# Patient Record
Sex: MALE | Race: WHITE | Employment: FULL TIME | URBAN - METROPOLITAN AREA
[De-identification: names, ages, dates, MRNs, and addresses within clinical notes are randomized per-mention and may not be internally consistent; named-entity substitution may affect disease eponyms.]

---

## 2021-06-09 ENCOUNTER — OFFICE VISIT (OUTPATIENT)
Dept: ORTHOPEDIC SURGERY | Age: 36
End: 2021-06-09
Payer: COMMERCIAL

## 2021-06-09 VITALS — BODY MASS INDEX: 30.8 KG/M2 | HEIGHT: 71 IN | WEIGHT: 220 LBS

## 2021-06-09 DIAGNOSIS — M54.16 LUMBAR RADICULAR PAIN: Primary | ICD-10-CM

## 2021-06-09 DIAGNOSIS — M62.830 LUMBAR PARASPINAL MUSCLE SPASM: ICD-10-CM

## 2021-06-09 PROCEDURE — G8427 DOCREV CUR MEDS BY ELIG CLIN: HCPCS | Performed by: PHYSICIAN ASSISTANT

## 2021-06-09 PROCEDURE — 4004F PT TOBACCO SCREEN RCVD TLK: CPT | Performed by: PHYSICIAN ASSISTANT

## 2021-06-09 PROCEDURE — G8417 CALC BMI ABV UP PARAM F/U: HCPCS | Performed by: PHYSICIAN ASSISTANT

## 2021-06-09 PROCEDURE — 99203 OFFICE O/P NEW LOW 30 MIN: CPT | Performed by: PHYSICIAN ASSISTANT

## 2021-06-09 RX ORDER — CYCLOBENZAPRINE HCL 10 MG
10 TABLET ORAL 3 TIMES DAILY PRN
Qty: 20 TABLET | Refills: 0 | Status: SHIPPED | OUTPATIENT
Start: 2021-06-09 | End: 2021-06-19

## 2021-06-09 RX ORDER — METHYLPREDNISOLONE 4 MG/1
TABLET ORAL
Qty: 1 KIT | Refills: 0 | Status: SHIPPED | OUTPATIENT
Start: 2021-06-09 | End: 2021-09-07

## 2021-06-10 NOTE — PROGRESS NOTES
This dictation was done with [place] dictation and may contain mechanical errors related to translation. I have today reviewed with Wendy Stanton the clinically relevant, past medical history, medications, allergies, family history, social history, and Review Of Systems form the patients most recent history form & I have documented any details relevant to today's presenting complaints in my history below. Mr. Salvatore Ramírez's self-reported past medical history, medications, allergies, family history, social history, and Review Of Systems form has been scanned into the chart under the \"Media\" tab. Subjective:  Wendy Stanton is a 30 y. o. who is here complaining of an acute onset of lower back pain that is radiating down the left leg and with. 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 there was one particular time when he was moving a refrigerator and had to lean forward and felt a pull in the back around 3 weeks ago. He says at rest he is throughout 10 pain but his knees and has never. I sent him for x-rays including an AP and lateral lumbar spine. There is no problem list on file for this patient. No current outpatient medications on file prior to visit. No current facility-administered medications on file prior to visit. Objective:   Height 5' 11\" (1.803 m), weight 220 lb (99.8 kg). Upon examination this is a pleasant 28-year-old gentleman in no acute distress he is alert and oriented x3 he is somewhat apprehensive when he is getting up in moving he has spasms mainly on the right side and perhaps little bit of radicular pain going into the left leg is a positive straight leg raise. Pulses are intact reflexes are brisk and intact. Skin normal muscle tone and no cutaneous lesions or lymphadenopathy.   He has no pain with extension than with flexion and is also tight with rotation of his lower back x-rays taken the

## 2021-06-11 ENCOUNTER — HOSPITAL ENCOUNTER (OUTPATIENT)
Dept: PHYSICAL THERAPY | Age: 36
Setting detail: THERAPIES SERIES
Discharge: HOME | End: 2021-06-11
Payer: COMMERCIAL

## 2021-06-11 PROCEDURE — 97530 THERAPEUTIC ACTIVITIES: CPT | Performed by: CHIROPRACTOR

## 2021-06-11 PROCEDURE — 97161 PT EVAL LOW COMPLEX 20 MIN: CPT | Performed by: CHIROPRACTOR

## 2021-06-11 PROCEDURE — G0283 ELEC STIM OTHER THAN WOUND: HCPCS | Performed by: CHIROPRACTOR

## 2021-06-11 ASSESSMENT — PAIN SCALES - QUEBEC BACK PAIN DISABILITY SCALE
TAKE FOOD OUT OF THE REFRIGERATOR: 0
LIFT AND CARRY A HEAVY SUITCASE: 2
QUEBEC DISABILITY INDEX: 60-79%
STAND UP FOR 20 TO 30 MINUTES: 2
TOTAL SCORE: 60
MAKE YOUR BED: 2
PULL OR PUSH HEAVY DOORS: 3
RIDE IN A CAR: 3
WALK A FEW BLOCKS OR 300 TO 400M: 4
REACH UP TO HIGH SHELVES: 2
WALK SEVERAL KILOMETERS  OR MILES: 4
THROW A BALL: 2
MOVE A CHAIR: 3
CARRY TWO BAGS OF GROCERIES: 2
PUT ON SOCKS OR PANYHOSE: 4
TURN OVER IN BED: 4
SLEEP THROUGH THE NIGHT: 5
QUEBEC CMS MODIFIER: CL
BEND OVER TO CLEAN THE BATHTUB: 4
SIT IN A CHAIR FOR SEVERAL HOURS: 2
CLIMB ONE FLIGHT OF STAIRS: 3
RUN ONE BLOCK OR 100M: 5
GET OUT OF BED: 4

## 2021-06-11 NOTE — PLAN OF CARE
[facility], [facility]  [address] [facility] [place], [place] [facility]  Phone: [phone]   Fax:     [phone]                                                       Physical Therapy Certification    Dear Referring Practitioner: JOSHUA Welch,    We had the pleasure of evaluating the following patient for physical therapy services at [facility]. A summary of our findings can be found in the initial assessment below. This includes our plan of care. If you have any questions or concerns regarding these findings, please do not hesitate to contact me at the office phone number checked above.   Thank you for the referral.       Physician Signature:_______________________________Date:__________________  By signing above (or electronic signature), therapists plan is approved by physician        Patient: Farzaneh Espinal   : 1985   MRN: [id number]  Referring Physician: Referring Practitioner: JOSHUA Welch      Evaluation Date: 2021      Medical Diagnosis Information:  Diagnosis: Lumbar Radicular pain   Treatment Diagnosis: Pain mainly on L side LB and L buttock                                         Insurance information: PT Insurance Information: [address]     Precautions/ Contra-indications:  Latex Allergy:  [x]NO      []YES  Preferred Language for Healthcare:   [x]English       []other:    C-SSRS Triggered by Intake questionnaire (Past 2 wk assessment ):   [x] No, Questionnaire did not trigger screening.   [] Yes, Patient intake triggered C-SSRS Screening      [] C-SSRS Screening completed  [] PCP notified via Epic     SUBJECTIVE: Patient stated complaint: C/o pain in LB radiating to L buttock    Relevant Medical History:Additional Pertinent Hx: Heart Surgery (Age 5)  Functional Scale/Score: [place] = 60    Pain Scale: 9-10/10  Easing factors: Rest  Provocative factors: Bending, being in 1 position for too long     Type: [x]Constant   []Intermittent  []Radiating []Localized []other:     Numbness/Tingling: No    Occupation / School: Works in [employer] which requires bending and lifting    Living Status/Prior Level of Function: Independent with ADLs and IADLs,     OBJECTIVE:   Palpation:  Increased muscle tone in LB (L>R)    Functional Mobility/Transfers: Slow, but independent    Posture: L upper body shift    Gait: (include devices/WB status)  Amb slowly , but without any assistive device    Bandages/Dressings/Incisions: NA    Repeated Movements:    ROM  Comments   Lumbar Flex Dec 25%    Lumbar Ext Dec 75%      ROM LEFT RIGHT Comments   Lumbar Side Bend Dec 25% Dec 50%  Painful on L     Lumbar Rotation      Quadrant      Hip Flexion      Hip Abd      Hip ER      Hip IR      Hip Extension      Knee Ext      Knee Flex      Hamstring Flex      Piriformis      Rey test                Myotomes/Strength Normal Abnormal Comments   [x]ALL NORMAL      Hip Abd      Hip Ext      Hip flexion (L1-L2 femoral) [] [] L- 4+/5 limited by pain   Knee extension (L2-L4 femoral) [] []    Knee flexion (S1 sciatic)      Dorsiflexion (L4-L5 deep peroneal) [] []    Great Toe Ext (L5 deep peroneal nerve) [] []    Ankle Eversion (S1-S2 super peroneal) [] []    Ankle PF(S1-S2 tibial) [] []    Multifidus [] []    Transverse Ab [] [] 3-/5     Dermatomes Normal Abnormal Comments   [x]ALL NORMAL            inguinal area (L1)  [] []    anterior mid-thigh (L2) [] []    distal ant thigh/med knee (L3) [] []    medial lower leg and foot (L4) [] []    lateral lower leg and foot (L5) [] []    posterior calf (S1) [] []    medial calcaneus (S2) [] []      Reflexes Normal Abnormal Comments   [x]ALL NORMAL            S1-2 Seated achilles [] []    S1-2 Prone knee bend [] []    L3-4 Patellar tendon [] []    C5-6 Biceps [] []    C6 Brachioradialis [] []    C7-8 Triceps [] []    Clonus [] []    Babinski [] []    Karimi's [] []      Joint mobility:    []Normal    []Hypo   []Hyper    Neurodynamics:     Orthopedic Special Tests: *  Neural dynamic tension testing Normal Abnormal Comments   Slump Test  - Degree of knee flexion:  [] []    SLR  [] []    0-30 [] []    30-70 [] []    Femoral nerve (L2-4) [] []       Normal Abnormal N/A Comments   Fwd Bend-aberrant or innominate mvmt) [] [] []    Trendelenburg [] [] []    Kemps/Quadrant [] [] []    Stork [] [] []    ARMIDA/Eric [] [] []    Hip scour [] [] []    Supine to sit [] [] []    Prone knee bend [] [] []           Hip thrust [] [] []    SI distraction/compression [] [] []    Sacral Spring/thrust [] [] []               Supine B LE traction decreases and centralizes symptoms      [x] Patient history, allergies, meds reviewed. Medical chart reviewed. See intake form. Review Of Systems (ROS):  [x]Performed Review of systems (Integumentary, CardioPulmonary, Neurological) by intake and observation. Intake form has been scanned into medical record. Patient has been instructed to contact their primary care physician regarding ROS issues if not already being addressed at this time.       Co-morbidities/Complexities (which will affect course of rehabilitation):   []None           Arthritic conditions   []Rheumatoid arthritis (M05.9)  []Osteoarthritis (M19.91)   Cardiovascular conditions   []Hypertension (I10)  []Hyperlipidemia (E78.5)  []Angina pectoris (I20)  []Atherosclerosis (I70)  []CVA Musculoskeletal conditions   []Disc pathology   []Congenital spine pathologies   []Prior surgical intervention  []Osteoporosis (M81.8)  []Osteopenia (M85.8)   Endocrine conditions   []Hypothyroid (E03.9)  []Hyperthyroid Gastrointestinal conditions   []Constipation (T79.45)   Metabolic conditions   []Morbid obesity (E66.01)  []Diabetes type 1(E10.65) or 2 (E11.65)   []Neuropathy (G60.9)     Pulmonary conditions   []Asthma (J45)  []Coughing   []COPD (J44.9)   Psychological Disorders  []Anxiety (F41.9)  []Depression (F32.9)   []Other:   []Other:           Barriers to/and or personal factors that will affect rehab potential:              []Age  []Sex    []Smoker              []Motivation/Lack of Motivation                        []Co-Morbidities              []Cognitive Function, education/learning barriers              []Environmental, home barriers              []profession/work barriers  []past PT/medical experience  []other:  Justification:     Falls Risk Assessment (30 days):   [x] Falls Risk assessed and no intervention required.   [] Falls Risk assessed and Patient requires intervention due to being higher risk   TUG score (>12s at risk):     [] Falls education provided, including:         ASSESSMENT:   Functional Impairments:     []Noted lumbar/proximal hip hypomobility   []Noted lumbosacral and/or generalized hypermobility   []Decreased Lumbosacral/hip/LE functional ROM   [x]Decreased core/proximal hip strength and neuromuscular control    [x]Decreased LE functional strength    []Abnormal reflexes/sensation/myotomal/dermatomal deficits  []Reduced balance/proprioceptive control    []other:      Functional Activity Limitations (from functional questionnaire and intake)   [x]Reduced ability to tolerate prolonged functional positions   [x]Reduced ability or difficulty with changes of positions or transfers between positions   [x]Reduced ability to maintain good posture and demonstrate good body mechanics with sitting, bending, and lifting   [x]Reduced ability to sleep   [x] Reduced ability or tolerance with driving and/or computer work   [x]Reduced ability to perform lifting, reaching, carrying tasks   [x]Reduced ability to squat   [x]Reduced ability to forward bend   [x]Reduced ability to ambulate prolonged functional periods/distances/surfaces   [x]Reduced ability to ascend/descend stairs   []other:       Participation Restrictions   [x]Reduced participation in self care activities   [x]Reduced participation in home management activities   [x]Reduced participation in work activities   [x]Reduced participation in social activities. [x]Reduced participation in sport/recreational activities. Classification:   []Signs/symptoms consistent with Lumbar instability/stabilization subgroup. []Signs/symptoms consistent with Lumbar mobilization/manipulation subgroup, myotomes and dermatomes intact. Meets manipulation criteria. [x]Signs/symptoms consistent with Lumbar direction specific/centralization subgroup   []Signs/symptoms consistent with Lumbar traction subgroup       []Signs/symptoms consistent with lumbar facet dysfunction   []Signs/symptoms consistent with lumbar stenosis type dysfunction   []Signs/symptoms consistent with nerve root involvement including myotome & dermatome dysfunction   []Signs/symptoms consistent with post-surgical status including: decreased ROM, strength and function.    []signs/symptoms consistent with pathology which may benefit from Dry needling     []other:      Prognosis/Rehab Potential:      []Excellent   [x]Good    []Fair   []Poor    Tolerance of evaluation/treatment:    []Excellent   [x]Good    []Fair   []Poor     Physical Therapy Evaluation Complexity Justification  [x] A history of present problem with:  [x] no personal factors and/or comorbidities that impact the plan of care;  []1-2 personal factors and/or comorbidities that impact the plan of care  []3 personal factors and/or comorbidities that impact the plan of care  [x] An examination of body systems using standardized tests and measures addressing any of the following: body structures and functions (impairments), activity limitations, and/or participation restrictions;:  [x] a total of 1-2 or more elements   [] a total of 3 or more elements   [] a total of 4 or more elements   [x] A clinical presentation with:  [x] stable and/or uncomplicated characteristics   [] evolving clinical presentation with changing characteristics  [] unstable and unpredictable characteristics;   [x] Clinical decision making of [] low, [] moderate, [] high complexity using standardized patient assessment instrument and/or measurable assessment of functional outcome. [x] EVAL (LOW) 96943 (typically 20 minutes face-to-face)  [] EVAL (MOD) 37666 (typically 30 minutes face-to-face)  [] EVAL (HIGH) 87883 (typically 45 minutes face-to-face)  [] RE-EVAL     PLAN: Begin PT focusing on: proximal hip mobilizations, LB mobs, LB core activation, proximal hip activation, and HEP    Frequency/Duration: 2-3 days per week for 4-6 Weeks:  Interventions:  [x]  Therapeutic exercise including: strength training, ROM, for LE, Glutes and core   [x]  NMR activation and proprioception for glutes , LE and Core   [x]  Manual therapy as indicated for Hip complex, LE and spine to include: Dry Needling/IASTM, STM, PROM, Gr I-IV mobilizations, manipulation. [x]  Modalities as needed that may include: thermal agents, E-stim, Biofeedback, US, iontophoresis as indicated  [x]  Patient education on joint protection, postural re-education, activity modification, progression of HEP. [x]  Aquatic exercise including: strength training, ROM and balance for LE, Glutes and core     HEP instruction: Voiced understanding of home exer, posture and body mechanics    GOALS:  Patient stated goal:   [] Progressing: [] Met: [] Not Met: [] Adjusted  Therapist goals for Patient:   Short Term Goals: To be achieved in: 2 weeks  1. Independent in HEP and progression per patient tolerance, in order to prevent re-injury. [] Progressing: [] Met: [] Not Met: [] Adjusted  2. Patient will have a decrease in pain to facilitate improvement in movement, function, and ADLs as indicated by Functional Deficits. [] Progressing: [] Met: [] Not Met: [] Adjusted    Long Term Goals: To be achieved in: 6 weeks  1.  Disability index score of 20% or less for the GERRY to assist with reaching prior level of function. [] Progressing: [] Met: [] Not Met: [] Adjusted  2. Patient will demonstrate increased AROM to WNL, good LS mobility, good hip ROM to allow for proper joint functioning as indicated by patients Functional Deficits. [] Progressing: [] Met: [] Not Met: [] Adjusted  3. Patient will demonstrate an increase in Strength to good proximal hip and core activation to allow for proper functional mobility as indicated by patients Functional Deficits. [] Progressing: [] Met: [] Not Met: [] Adjusted  4. Patient will return to usual functional activities without increased symptoms or restriction. [] Progressing: [] Met: [] Not Met: [] Adjusted    Electronically signed by:  Mayi FELDMAN#[id number]        Note: If patient does not return for scheduled/recommended follow up visits, this note will serve as a discharge from care along with the most recent update on progress.

## 2021-06-11 NOTE — FLOWSHEET NOTE
[facility], [facility]  [address] [facility] [place], [facility]  Phone: [phone]   Fax:     [phone]    Physical Therapy Treatment Note/ Progress Report:     Date:  2021    Patient Name:  Kiel Galvan    :  1985  MRN: [id number]    Pertinent Medical History: Additional Pertinent Hx: Heart Surgery (Age 5)    Medical/Treatment Diagnosis Information:  · Diagnosis: Lumbar Radicular pain  · Treatment Diagnosis: Pain mainly on L side LB and L buttock    Insurance/Certification information:   [address]  Physician Information:     JOSHUA Gao  Plan of care signed (Y/N): Inbox    Date of Patient follow up with Physician:      Progress Report: []  Yes  [x]  No     Date Range for reporting period:  Beginnin2021  Ending:    Progress report due (10 Rx/or 30 days whichever is less):     Recertification due (POC duration/ or 90 days whichever is less):    Visit # POC/ Insurance Allowable Auth Needed   - 30 []Yes    [x]No     Functional Outcomes Measure:   Date Assessed: at eval  Test: [place]   Score: 60    Pain level:  8-9//10     History of Injury:   Injured back bending and lifting ~ 3 weeks ago. He was beginning to feel better and re-injured back while bending and lifting.  Began Medrol pack yesterday    SUBJECTIVE:  C/o pain in LB radiating into L buttock    OBJECTIVE:    Observation: B tight hamstrings (50 degrees SLR)   Test measurements:      RESTRICTIONS/PRECAUTIONS:     Exercises/Interventions: Able to decrease and centralize symptoms with supine manual  B LE traction    Therapeutic Ex (54889)   Min: Resistance/Reps Notes/Cues   Ext in stand          Prone prop          Therapeutic Activity (03254) Min:      Reviewed home exer, posture and body mechanics                    NMR re-education (41825)   Min:                         Manual Intervention (77092) Min: Supine manual B LE traction 15 sec x 4    Manual B LE stretches stretch R/L    Hamstring stretches          Modalities  Min:           E-stim    (sit) IFC with MH x 15 min           Other Therapeutic Activities:  Pt was educated on PT POC, Diagnosis, Prognosis, pathomechanics as well as frequency and duration of scheduling future physical therapy appointments. Time was also taken on this day to answer all patient questions and participation in PT. Reviewed appointment policy in detail with patient and patient verbalized understanding. Home Exercise Program: Patient instructed in the following for HEP:   . Patient verbalized/demonstrated understanding and was issued written handout. Therapeutic Exercise and NMR EXR  [] (09210) Provided verbal/tactile cueing for activities related to strengthening, flexibility, endurance, ROM  for improvements in proximal hip and core control with self care, mobility, lifting and ambulation.  [] (17823) Provided verbal/tactile cueing for activities related to improving balance, coordination, kinesthetic sense, posture, motor skill, proprioception  to assist with core control in self care, mobility, lifting, and ambulation.      Therapeutic Activities:    [] (86060 or 48702) Provided verbal/tactile cueing for activities related to improving balance, coordination, kinesthetic sense, posture, motor skill, proprioception and motor activation to allow for proper function  with self care and ADLs  [] (71528) Provided training and instruction to the patient for proper core and proximal hip recruitment and positioning with ambulation re-education     Home Exercise Program:    [] (86593) Reviewed/Progressed HEP activities related to strengthening, flexibility, endurance, ROM of core, proximal hip and LE for functional self-care, mobility, lifting and ambulation   [] (50621) Reviewed/Progressed HEP activities related to improving balance, coordination, kinesthetic sense, posture, motor skill, proprioception of core, proximal hip and LE for self care, mobility, lifting, and ambulation      Manual Treatments:  PROM / STM / Oscillations-Mobs:  G-I, II, III, IV (PA's, Inf., Post.)  [] (05213) Provided manual therapy to mobilize proximal hip and LS spine soft tissue/joints for the purpose of modulating pain, promoting relaxation,  increasing ROM, reducing/eliminating soft tissue swelling/inflammation/restriction, improving soft tissue extensibility and allowing for proper ROM for normal function with self care, mobility, lifting and ambulation. If [facility] Please Indicate Time In/Out  CPT Code Time in Time out                                   Approval Dates:  CPT Code Units Approved Units Used  Date Updated:                     Charges:  Timed Code Treatment Minutes: 15   Total Treatment Minutes: 45     [x] EVAL (LOW) 17635 (typically 20 minutes face-to-face)  [] EVAL (MOD) 01475 (typically 30 minutes face-to-face)  [] EVAL (HIGH) 76355 (typically 45 minutes face-to-face)  [] RE-EVAL     [] QE(11730) x     [] Dry needle 1 or 2 Muscles (68726)  [] NMR (05801) x     [] Dry needle 3+ Muscles (95554)  [] Manual (11988) x     [] Ultrasound (20027) x  [x] TA (12240) x     [] Mech Traction (31733)  [] ES(attended) (02749)     [x] ES (un) (15337):   [] Vasopump (32400) [] Ionto (69179)   [] Other:    GOALS:  Patient stated goal:   []? Progressing: []? Met: []? Not Met: []? Adjusted  Therapist goals for Patient:   Short Term Goals: To be achieved in: 2 weeks  1. Independent in HEP and progression per patient tolerance, in order to prevent re-injury. []? Progressing: []? Met: []? Not Met: []? Adjusted  2. Patient will have a decrease in pain to facilitate improvement in movement, function, and ADLs as indicated by Functional Deficits. []? Progressing: []? Met: []? Not Met: []? Adjusted     Long Term Goals: To be achieved in: 6 weeks  1.  Disability index score of 20% or less for the GERRY to assist with reaching prior level of function. []? Progressing: []? Met: []? Not Met: []? Adjusted  2. Patient will demonstrate increased AROM to WNL, good LS mobility, good hip ROM to allow for proper joint functioning as indicated by patients Functional Deficits. []? Progressing: []? Met: []? Not Met: []? Adjusted  3. Patient will demonstrate an increase in Strength to good proximal hip and core activation to allow for proper functional mobility as indicated by patients Functional Deficits. []? Progressing: []? Met: []? Not Met: []? Adjusted  4. Patient will return to usual functional activities without increased symptoms or restriction. []? Progressing: []? Met: []? Not Met: []? Adjusted    ASSESSMENT:  Able to centralize symptoms    Treatment/Activity Tolerance:  [x] Patient tolerated treatment well [] Patient limited by fatique  [] Patient limited by pain  [] Patient limited by other medical complications  [] Other:     Overall Progression Towards Functional goals/ Treatment Progress Update:  [] Patient is progressing as expected towards functional goals listed. [] Progression is slowed due to complexities/Impairments listed. [] Progression has been slowed due to co-morbidities. [x] Plan just implemented, too soon to assess goals progression <30days   [] Goals require adjustment due to lack of progress  [] Patient is not progressing as expected and requires additional follow up with physician  [] Other:    Prognosis for POC: [x] Good [] Fair  [] Poor    Patient requires continued skilled intervention: [x] Yes  [] No        PLAN: See eval  [] Continue per plan of care [] Alter current plan (see comments)  [x] Plan of care initiated [] Hold pending MD visit [] Discharge    Electronically signed by: Jenelle FOX#[id number]    Note: If patient does not return for scheduled/recommended follow up visits, this note will serve as a discharge from care along with the most recent update on progress.

## 2021-06-15 ENCOUNTER — HOSPITAL ENCOUNTER (OUTPATIENT)
Dept: PHYSICAL THERAPY | Age: 36
Setting detail: THERAPIES SERIES
Discharge: HOME | End: 2021-06-15
Payer: COMMERCIAL

## 2021-06-15 PROCEDURE — 97530 THERAPEUTIC ACTIVITIES: CPT

## 2021-06-15 PROCEDURE — 97110 THERAPEUTIC EXERCISES: CPT

## 2021-06-15 NOTE — FLOWSHEET NOTE
press ups 2 x 5 no leg pain, min LBP Given written HEP   Prone press ups with OP Add as indicated    Prone press ups with sag Add as indicated         Standing back ext X 5                    Therapeutic Activity (22810) Min:  20     Reviewed home exer, posture and body mechanics Review of body mechanics, lifting kids, resting at home, driving posture and body mechanics at work Given lumbar roll                  NMR re-education (76592)   Min:                         Manual Intervention (16334) Min:           Prone manual B LE traction 1 min x 4 prone  With towel and belt        Modalities  Min:             Other Therapeutic Activities:  Pt was educated on PT POC, Diagnosis, Prognosis, pathomechanics as well as frequency and duration of scheduling future physical therapy appointments. Time was also taken on this day to answer all patient questions and participation in PT. Reviewed appointment policy in detail with patient and patient verbalized understanding. Home Exercise Program: Patient instructed in the following for HEP:   . Patient verbalized/demonstrated understanding and was issued written handout. Therapeutic Exercise and NMR EXR  [] (29898) Provided verbal/tactile cueing for activities related to strengthening, flexibility, endurance, ROM  for improvements in proximal hip and core control with self care, mobility, lifting and ambulation.  [] (14798) Provided verbal/tactile cueing for activities related to improving balance, coordination, kinesthetic sense, posture, motor skill, proprioception  to assist with core control in self care, mobility, lifting, and ambulation.      Therapeutic Activities:    [] (24456 or 85724) Provided verbal/tactile cueing for activities related to improving balance, coordination, kinesthetic sense, posture, motor skill, proprioception and motor activation to allow for proper function  with self care and ADLs  [] (38675) Provided training and instruction to the patient for proper core and proximal hip recruitment and positioning with ambulation re-education     Home Exercise Program:    [] (81376) Reviewed/Progressed HEP activities related to strengthening, flexibility, endurance, ROM of core, proximal hip and LE for functional self-care, mobility, lifting and ambulation   [] (40458) Reviewed/Progressed HEP activities related to improving balance, coordination, kinesthetic sense, posture, motor skill, proprioception of core, proximal hip and LE for self care, mobility, lifting, and ambulation      Manual Treatments:  PROM / STM / Oscillations-Mobs:  G-I, II, III, IV (PA's, Inf., Post.)  [] (38734) Provided manual therapy to mobilize proximal hip and LS spine soft tissue/joints for the purpose of modulating pain, promoting relaxation,  increasing ROM, reducing/eliminating soft tissue swelling/inflammation/restriction, improving soft tissue extensibility and allowing for proper ROM for normal function with self care, mobility, lifting and ambulation. Charges:  Timed Code Treatment Minutes: 50   Total Treatment Minutes: 50     [] EVAL (LOW) 17696 (typically 20 minutes face-to-face)  [] EVAL (MOD) 31659 (typically 30 minutes face-to-face)  [] EVAL (HIGH) 25823 (typically 45 minutes face-to-face)  [] RE-EVAL     [x] TD(47459) x   2  [] Dry needle 1 or 2 Muscles (11612)  [] NMR (99316) x     [] Dry needle 3+ Muscles (74861)  [] Manual (40830) x     [] Ultrasound (52076) x  [x] TA (24626) x   1  [] Mech Traction (70887)  [] ES(attended) (50519)     [] ES (un) (12776):   [] Vasopump (87333) [] Ionto (77810)   [] Other:    GOALS:  Patient stated goal:   []? Progressing: []? Met: []? Not Met: []? Adjusted  Therapist goals for Patient:   Short Term Goals: To be achieved in: 2 weeks  1. Independent in HEP and progression per patient tolerance, in order to prevent re-injury. []? Progressing: []? Met: []? Not Met: []? Adjusted  2.  Patient will have a decrease in pain to facilitate improvement in movement, function, and ADLs as indicated by Functional Deficits. []? Progressing: []? Met: []? Not Met: []? Adjusted     Long Term Goals: To be achieved in: 6 weeks  1. Disability index score of 20% or less for the GERRY to assist with reaching prior level of function. []? Progressing: []? Met: []? Not Met: []? Adjusted  2. Patient will demonstrate increased AROM to WNL, good LS mobility, good hip ROM to allow for proper joint functioning as indicated by patients Functional Deficits. []? Progressing: []? Met: []? Not Met: []? Adjusted  3. Patient will demonstrate an increase in Strength to good proximal hip and core activation to allow for proper functional mobility as indicated by patients Functional Deficits. []? Progressing: []? Met: []? Not Met: []? Adjusted  4. Patient will return to usual functional activities without increased symptoms or restriction. []? Progressing: []? Met: []? Not Met: []? Adjusted    ASSESSMENT: 6/15 symptoms centralized, pt demonstrates understanding of disc mechanics, body mechanics and posture required to control radicular symptoms. Treatment/Activity Tolerance:  [x] Patient tolerated treatment well [] Patient limited by fatique  [] Patient limited by pain  [] Patient limited by other medical complications  [] Other:     Overall Progression Towards Functional goals/ Treatment Progress Update:  [x] Patient is progressing as expected towards functional goals listed. [] Progression is slowed due to complexities/Impairments listed. [] Progression has been slowed due to co-morbidities.   [] Plan just implemented, too soon to assess goals progression <30days   [] Goals require adjustment due to lack of progress  [] Patient is not progressing as expected and requires additional follow up with physician  [] Other:    Prognosis for POC: [x] Good [] Fair  [] Poor    Patient requires continued skilled intervention: [x] Yes  [] No        PLAN:Extension/body mechanics/ posture  [x] Continue per plan of care [] Alter current plan (see comments)  [] Plan of care initiated [] Hold pending MD visit [] Discharge    Electronically signed by: Gemini Naranjo PT DPT, MS  [id number]    Note: If patient does not return for scheduled/recommended follow up visits, this note will serve as a discharge from care along with the most recent update on progress.

## 2021-06-18 ENCOUNTER — APPOINTMENT (OUTPATIENT)
Dept: PHYSICAL THERAPY | Age: 36
End: 2021-06-18
Payer: COMMERCIAL

## 2021-06-21 ENCOUNTER — APPOINTMENT (OUTPATIENT)
Dept: PHYSICAL THERAPY | Age: 36
End: 2021-06-21
Payer: COMMERCIAL

## 2021-06-22 ENCOUNTER — HOSPITAL ENCOUNTER (OUTPATIENT)
Dept: PHYSICAL THERAPY | Age: 36
Setting detail: THERAPIES SERIES
Discharge: HOME | End: 2021-06-22
Payer: COMMERCIAL

## 2021-06-22 PROCEDURE — 97110 THERAPEUTIC EXERCISES: CPT

## 2021-06-22 PROCEDURE — 97530 THERAPEUTIC ACTIVITIES: CPT

## 2021-06-22 PROCEDURE — 97140 MANUAL THERAPY 1/> REGIONS: CPT

## 2021-06-22 NOTE — FLOWSHEET NOTE
[facility], [facility]  [address] [facility] [place], [facility]  Phone: [phone]   Fax:     [phone]    Physical Therapy Treatment Note/ Progress Report:     Date:  2021    Patient Name:  Priyank De La Cruz    :  1985  MRN: [id number]    Pertinent Medical History: Additional Pertinent Hx: Heart Surgery (Age 5)    Medical/Treatment Diagnosis Information:  · Diagnosis: Lumbar Radicular pain  · Treatment Diagnosis: Pain mainly on L side LB and L buttock    Insurance/Certification information:   [address]  Physician Information:     JOSHUA Valencia  Plan of care signed (Y/N): Inbox    Date of Patient follow up with Physician:      Progress Report: []  Yes  [x]  No     Date Range for reporting period:  Beginnin2021  Ending:    Progress report due (10 Rx/or 30 days whichever is less):     Recertification due (POC duration/ or 90 days whichever is less):    Visit # POC/ Insurance Allowable Auth Needed   3/12- 30 []Yes    [x]No     Functional Outcomes Measure:   Date Assessed: at eval  Test: [place]   Score: 60    Pain level:  8-9//10     History of Injury:   Injured back bending and lifting ~ 3 weeks ago. He was beginning to feel better and re-injured back while bending and lifting. Began Medrol pack yesterday    SUBJECTIVE:  C/o pain in LB radiating into L buttock  21: States not sure if PT is helping or not. Thinks he should get MRI. States is having to drive a lot at work and is aggravating symptoms.  Exercises centralize symptoms but he is not able to do it much    OBJECTIVE:    Observation: B tight hamstrings (50 degrees SLR)   Test measurements:      RESTRICTIONS/PRECAUTIONS:     Exercises/Interventions: Able to decrease and centralize symptoms with supine manual  B LE traction    Therapeutic Ex (97068)   Min: 30 Resistance/Reps Notes/Cues        Prone      Prone with legs to left    Pain came out of leg  Min LBP     Educated about anatomy, disc mechanics, nerve root pressure. Given written handout        Prone on elbows  pain continued to centralize         Prone press ups 2 x 5 no leg pain, min LBP Given written HEP   Prone press ups with OP Add as indicated    Prone press ups with sag Add as indicated         Standing back ext                     Therapeutic Activity (48050) Min:  20     Reviewed home exer, posture and body mechanics Review of body mechanics, lifting kids, resting at home, driving posture and body mechanics at work Given lumbar roll                  NMR re-education (28187)   Min:                         Manual Intervention (12332) Min:      Mobilizations PA to lumbar    Prone manual B LE traction  With towel and belt   STM  To lumbar paraspinals    Modalities  Min:             Other Therapeutic Activities:    Pt was educated on PT POC, Diagnosis, Prognosis, pathomechanics as well as frequency and duration of scheduling future physical therapy appointments. Time was also taken on this day to answer all patient questions and participation in PT. Reviewed appointment policy in detail with patient and patient verbalized understanding.     6/22/21: Educated patient on what activities to avoid during the day, flexion. Recommended try pillow when driving. Recommended pt get prone for at least 5 min and to do press ups every hour. Recommended pt avoid bending or sitting at all cost.     Home Exercise Program: Patient instructed in the following for HEP:    . Patient verbalized/demonstrated understanding and was issued written handout.       Therapeutic Exercise and NMR EXR  [x] (84021) Provided verbal/tactile cueing for activities related to strengthening, flexibility, endurance, ROM  for improvements in proximal hip and core control with self care, mobility, lifting and ambulation.  [] (51439) Provided verbal/tactile cueing for activities related to improving balance, coordination, kinesthetic sense, posture, motor skill, proprioception  to assist with core control in self care, mobility, lifting, and ambulation. Therapeutic Activities:    [x] (46145 or 46569) Provided verbal/tactile cueing for activities related to improving balance, coordination, kinesthetic sense, posture, motor skill, proprioception and motor activation to allow for proper function  with self care and ADLs  [] (89943) Provided training and instruction to the patient for proper core and proximal hip recruitment and positioning with ambulation re-education     Home Exercise Program:    [x] (35463) Reviewed/Progressed HEP activities related to strengthening, flexibility, endurance, ROM of core, proximal hip and LE for functional self-care, mobility, lifting and ambulation   [] (22197) Reviewed/Progressed HEP activities related to improving balance, coordination, kinesthetic sense, posture, motor skill, proprioception of core, proximal hip and LE for self care, mobility, lifting, and ambulation      Manual Treatments:  PROM / STM / Oscillations-Mobs:  G-I, II, III, IV (PA's, Inf., Post.)  [x] (05795) Provided manual therapy to mobilize proximal hip and LS spine soft tissue/joints for the purpose of modulating pain, promoting relaxation,  increasing ROM, reducing/eliminating soft tissue swelling/inflammation/restriction, improving soft tissue extensibility and allowing for proper ROM for normal function with self care, mobility, lifting and ambulation.            Charges:  Timed Code Treatment Minutes: 25   Total Treatment Minutes: 25     [] EVAL (LOW) 79203 (typically 20 minutes face-to-face)  [] EVAL (MOD) 41761 (typically 30 minutes face-to-face)  [] EVAL (HIGH) 87821 (typically 45 minutes face-to-face)  [] RE-EVAL     [] TH(69887) x     [] Dry needle 1 or 2 Muscles (50601)  [] NMR (73438) x     [] Dry needle 3+ Muscles (05993)  [x] Manual (30670) x     [] Ultrasound (67526) x  [x] TA (37745) x   1  [] [facility] Traction (77242)  [] ES(attended) (48720)     [] ES (un) (48430):   [] Vasopump (83933) [] Ionto (68316)   [] Other:    GOALS:  Patient stated goal:   []? Progressing: []? Met: []? Not Met: []? Adjusted  Therapist goals for Patient:   Short Term Goals: To be achieved in: 2 weeks  1. Independent in HEP and progression per patient tolerance, in order to prevent re-injury. []? Progressing: []? Met: []? Not Met: []? Adjusted  2. Patient will have a decrease in pain to facilitate improvement in movement, function, and ADLs as indicated by Functional Deficits. []? Progressing: []? Met: []? Not Met: []? Adjusted     Long Term Goals: To be achieved in: 6 weeks  1. Disability index score of 20% or less for the GERRY to assist with reaching prior level of function. []? Progressing: []? Met: []? Not Met: []? Adjusted  2. Patient will demonstrate increased AROM to WNL, good LS mobility, good hip ROM to allow for proper joint functioning as indicated by patients Functional Deficits. []? Progressing: []? Met: []? Not Met: []? Adjusted  3. Patient will demonstrate an increase in Strength to good proximal hip and core activation to allow for proper functional mobility as indicated by patients Functional Deficits. []? Progressing: []? Met: []? Not Met: []? Adjusted  4. Patient will return to usual functional activities without increased symptoms or restriction. []? Progressing: []? Met: []? Not Met: []? Adjusted    ASSESSMENT: 6/15 symptoms centralized, pt demonstrates understanding of disc mechanics, body mechanics and posture required to control radicular symptoms. Treatment/Activity Tolerance:  [x] Patient tolerated treatment well [] Patient limited by fatique  [] Patient limited by pain  [] Patient limited by other medical complications  [] Other:     Overall Progression Towards Functional goals/ Treatment Progress Update:  [x] Patient is progressing as expected towards functional goals listed.     [] Progression is slowed due to complexities/Impairments listed. [] Progression has been slowed due to co-morbidities. [] Plan just implemented, too soon to assess goals progression <30days   [] Goals require adjustment due to lack of progress  [] Patient is not progressing as expected and requires additional follow up with physician  [] Other:    Prognosis for POC: [x] Good [] Fair  [] Poor    Patient requires continued skilled intervention: [x] Yes  [] No        PLAN:Extension/body mechanics/ posture  [x] Continue per plan of care [] Alter current plan (see comments)  [] Plan of care initiated [] Hold pending MD visit [] Discharge    Electronically signed by: Shayna Reno, PT DPMAKENNA, Abdon Michael [phone]    Note: If patient does not return for scheduled/recommended follow up visits, this note will serve as a discharge from care along with the most recent update on progress.

## 2021-06-24 ENCOUNTER — APPOINTMENT (OUTPATIENT)
Dept: PHYSICAL THERAPY | Age: 36
End: 2021-06-24
Payer: COMMERCIAL

## 2021-06-24 ENCOUNTER — HOSPITAL ENCOUNTER (OUTPATIENT)
Dept: PHYSICAL THERAPY | Age: 36
Setting detail: THERAPIES SERIES
Discharge: HOME | End: 2021-06-24
Payer: COMMERCIAL

## 2021-06-24 PROCEDURE — 97530 THERAPEUTIC ACTIVITIES: CPT

## 2021-06-24 PROCEDURE — G0283 ELEC STIM OTHER THAN WOUND: HCPCS

## 2021-06-24 PROCEDURE — 97140 MANUAL THERAPY 1/> REGIONS: CPT

## 2021-06-24 NOTE — FLOWSHEET NOTE
[facility], [facility]  [address] [place], [facility]  Phone: [phone]   Fax:     [phone]    Physical Therapy Treatment Note/ Progress Report:     Date:  2021    Patient Name:  Pavel Aguilar    :  1985  MRN: [id number]    Pertinent Medical History: Additional Pertinent Hx: Heart Surgery (Age 5)    Medical/Treatment Diagnosis Information:  · Diagnosis: Lumbar Radicular pain  · Treatment Diagnosis: Pain mainly on L side LB and L buttock    Insurance/Certification information:   [address]  Physician Information:     JOSHUA Zhao  Plan of care signed (Y/N): Inbox    Date of Patient follow up with Physician:      Progress Report: []  Yes  [x]  No     Date Range for reporting period:  Beginnin2021  Ending:    Progress report due (10 Rx/or 30 days whichever is less):     Recertification due (POC duration/ or 90 days whichever is less):    Visit # POC/ Insurance Allowable Auth Needed   - 30 []Yes    [x]No     Functional Outcomes Measure:   Date Assessed: at eval  Test: [place]   Score: 60    Pain level:  810     History of Injury:   Injured back bending and lifting ~ 3 weeks ago. He was beginning to feel better and re-injured back while bending and lifting. Began Medrol pack yesterday    SUBJECTIVE:  C/o pain in LB radiating into L buttock  21: States not sure if PT is helping or not. Thinks he should get MRI. States is having to drive a lot at work and is aggravating symptoms. Exercises centralize symptoms but he is not able to do it much  21: Pain is in back and down leg right now. Pain is 6/10 in am, best of the day.     OBJECTIVE:    Observation: B tight hamstrings (50 degrees SLR)   Test measurements:      RESTRICTIONS/PRECAUTIONS:     Exercises/Interventions: Able to decrease and centralize symptoms with supine manual  B LE traction    Therapeutic Ex (80876)   Min: 30 Resistance/Reps Notes/Cues        Prone      Prone with legs to left    Pain came out of leg  Min LBP     Educated about anatomy, disc mechanics, nerve root pressure. Given written handout        Prone on elbows  pain continued to centralize         Prone press ups 2 x 5 no leg pain, min LBP Given written HEP   Prone press ups with OP Add as indicated    Prone press ups with sag Add as indicated         Standing back ext                     Therapeutic Activity (05977) Min:  20     Reviewed home exer, posture and body mechanics Review of body mechanics, lifting kids, resting at home, driving posture and body mechanics at work Given lumbar roll                  NMR re-education (23750)   Min:                         Manual Intervention ([id number]) Min:      Mobilizations   PA to lumbar  Hip Distraction mobilization - left Centralized symptoms   Prone manual B LE traction  With towel and belt   STM  To lumbar paraspinals    Modalities  Min:        E-stim    (sit) IFC with MH x 15 min        Other Therapeutic Activities:    Pt was educated on PT POC, Diagnosis, Prognosis, pathomechanics as well as frequency and duration of scheduling future physical therapy appointments. Time was also taken on this day to answer all patient questions and participation in PT. Reviewed appointment policy in detail with patient and patient verbalized understanding.     6/22/21: Educated patient on what activities to avoid during the day, flexion. Recommended try pillow when driving. Recommended pt get prone for at least 5 min and to do press ups every hour. Recommended pt avoid bending or sitting at all cost.     Home Exercise Program: Patient instructed in the following for HEP:    . Patient verbalized/demonstrated understanding and was issued written handout.       Therapeutic Exercise and NMR EXR  [x] (30461) Provided verbal/tactile cueing for activities related to strengthening, flexibility, endurance, ROM  for improvements in proximal hip and core control with self care, mobility, lifting and ambulation.  [] (08208) Provided verbal/tactile cueing for activities related to improving balance, coordination, kinesthetic sense, posture, motor skill, proprioception  to assist with core control in self care, mobility, lifting, and ambulation. Therapeutic Activities:    [x] (99351 or 83685) Provided verbal/tactile cueing for activities related to improving balance, coordination, kinesthetic sense, posture, motor skill, proprioception and motor activation to allow for proper function  with self care and ADLs  [] (64847) Provided training and instruction to the patient for proper core and proximal hip recruitment and positioning with ambulation re-education     Home Exercise Program:    [x] (73711) Reviewed/Progressed HEP activities related to strengthening, flexibility, endurance, ROM of core, proximal hip and LE for functional self-care, mobility, lifting and ambulation   [] (78473) Reviewed/Progressed HEP activities related to improving balance, coordination, kinesthetic sense, posture, motor skill, proprioception of core, proximal hip and LE for self care, mobility, lifting, and ambulation      Manual Treatments:  PROM / STM / Oscillations-Mobs:  G-I, II, III, IV (PA's, Inf., Post.)  [x] (26290) Provided manual therapy to mobilize proximal hip and LS spine soft tissue/joints for the purpose of modulating pain, promoting relaxation,  increasing ROM, reducing/eliminating soft tissue swelling/inflammation/restriction, improving soft tissue extensibility and allowing for proper ROM for normal function with self care, mobility, lifting and ambulation.            Charges:  Timed Code Treatment Minutes: 46   Total Treatment Minutes: 62     [] EVAL (LOW) 79256 (typically 20 minutes face-to-face)  [] EVAL (MOD) 05871 (typically 30 minutes face-to-face)  [] EVAL (HIGH) 50668 (typically 45 minutes face-to-face)  [] RE-EVAL     [] PF(35263) x     [] Dry needle 1 or 2 Muscles (56833)  [] NMR (10187) x     [] Dry needle 3+ Muscles (00003)  [x] Manual (94306) x 2     [] Ultrasound (20685) x  [x] TA (47925) x   1  [] Mech Traction (81513)  [] ES(attended) (85475)     [x] ES (un) (03246):   [] Vasopump (24740) [] Ionto (67276)   [] Other:    GOALS:  Patient stated goal:   []? Progressing: []? Met: []? Not Met: []? Adjusted  Therapist goals for Patient:   Short Term Goals: To be achieved in: 2 weeks  1. Independent in HEP and progression per patient tolerance, in order to prevent re-injury. []? Progressing: []? Met: []? Not Met: []? Adjusted  2. Patient will have a decrease in pain to facilitate improvement in movement, function, and ADLs as indicated by Functional Deficits. []? Progressing: []? Met: []? Not Met: []? Adjusted     Long Term Goals: To be achieved in: 6 weeks  1. Disability index score of 20% or less for the GERRY to assist with reaching prior level of function. []? Progressing: []? Met: []? Not Met: []? Adjusted  2. Patient will demonstrate increased AROM to WNL, good LS mobility, good hip ROM to allow for proper joint functioning as indicated by patients Functional Deficits. []? Progressing: []? Met: []? Not Met: []? Adjusted  3. Patient will demonstrate an increase in Strength to good proximal hip and core activation to allow for proper functional mobility as indicated by patients Functional Deficits. []? Progressing: []? Met: []? Not Met: []? Adjusted  4. Patient will return to usual functional activities without increased symptoms or restriction. []? Progressing: []? Met: []? Not Met: []?  Adjusted    ASSESSMENT:   Prone press up and hip distraction centralized symptoms    Treatment/Activity Tolerance:  [x] Patient tolerated treatment well [] Patient limited by fatique  [] Patient limited by pain  [] Patient limited by other medical complications  [] Other:     Overall Progression Towards Functional goals/ Treatment Progress Update:  [x] Patient is progressing as expected towards functional goals listed. [] Progression is slowed due to complexities/Impairments listed. [] Progression has been slowed due to co-morbidities. [] Plan just implemented, too soon to assess goals progression <30days   [] Goals require adjustment due to lack of progress  [] Patient is not progressing as expected and requires additional follow up with physician  [] Other:    Prognosis for POC: [x] Good [] Fair  [] Poor    Patient requires continued skilled intervention: [x] Yes  [] No        PLAN:Extension/body mechanics/ posture  [x] Continue per plan of care [] Alter current plan (see comments)  [] Plan of care initiated [] Hold pending MD visit [] Discharge    Electronically signed by: Vanessa Butler, PT DPt     Note: If patient does not return for scheduled/recommended follow up visits, this note will serve as a discharge from care along with the most recent update on progress.

## 2021-06-29 ENCOUNTER — HOSPITAL ENCOUNTER (OUTPATIENT)
Dept: PHYSICAL THERAPY | Age: 36
Setting detail: THERAPIES SERIES
Discharge: HOME | End: 2021-06-29
Payer: COMMERCIAL

## 2021-06-29 PROCEDURE — G0283 ELEC STIM OTHER THAN WOUND: HCPCS

## 2021-06-29 PROCEDURE — 97140 MANUAL THERAPY 1/> REGIONS: CPT

## 2021-07-01 ENCOUNTER — TELEPHONE (OUTPATIENT)
Dept: ORTHOPEDIC SURGERY | Age: 36
End: 2021-07-01

## 2021-07-01 ENCOUNTER — HOSPITAL ENCOUNTER (OUTPATIENT)
Dept: PHYSICAL THERAPY | Age: 36
Setting detail: THERAPIES SERIES
Discharge: HOME | End: 2021-07-01
Payer: COMMERCIAL

## 2021-07-01 PROCEDURE — 97140 MANUAL THERAPY 1/> REGIONS: CPT

## 2021-07-01 PROCEDURE — G0283 ELEC STIM OTHER THAN WOUND: HCPCS

## 2021-07-01 NOTE — FLOWSHEET NOTE
[facility] [place]  [address] [facility]  Phone: [phone]   Fax:     [phone]    Physical Therapy Treatment Note/ Progress Report:     Date:  2021    Patient Name:  Samantha Neal    :  1985  MRN: [id number]    Pertinent Medical History: Additional Pertinent Hx: Heart Surgery (Age 5)    Medical/Treatment Diagnosis Information:  · Diagnosis: Lumbar Radicular pain  · Treatment Diagnosis: Pain mainly on L side LB and L buttock    Insurance/Certification information:   [address]  Physician Information:     JOSHUA Mccallum  Plan of care signed (Y/N): Inbox    Date of Patient follow up with Physician:      Progress Report: []  Yes  [x]  No     Date Range for reporting period:  Beginnin2021  Ending:    Progress report due (10 Rx/or 30 days whichever is less):     Recertification due (POC duration/ or 90 days whichever is less):    Visit # POC/ Insurance Allowable Auth Needed   - 30 []Yes    [x]No     Functional Outcomes Measure:   Date Assessed: at eval  Test: [place]   Score: 60    Pain level:  6/10     History of Injury:   Injured back bending and lifting ~ 3 weeks ago. He was beginning to feel better and re-injured back while bending and lifting. Began Medrol pack yesterday    SUBJECTIVE:  C/o pain in LB radiating into L buttock  21: States not sure if PT is helping or not. Thinks he should get MRI. States is having to drive a lot at work and is aggravating symptoms. Exercises centralize symptoms but he is not able to do it much  21: Pain is in back and down leg right now. Pain is 6/10 in am, best of the day.  21: Patient reports leg pain is not as frequent,back still sore. States driving tends to provoke the symptoms. 21: Pt states he is doing a little better, has been laying prone more often.  Driving still really bothers it    OBJECTIVE:  Observation: B tight hamstrings (50 degrees SLR)   Test measurements:      RESTRICTIONS/PRECAUTIONS:     Exercises/Interventions: Able to decrease and centralize symptoms with supine manual  B LE traction    Therapeutic Ex (23982)   Min: 30 Resistance/Reps Notes/Cues        Prone      Prone with legs to left    Pain came out of leg  Min LBP     Educated about anatomy, disc mechanics, nerve root pressure. Given written handout        Prone on elbows  pain continued to centralize         Prone press ups 2 x 5 no leg pain, min LBP Given written HEP   Prone press ups with OP 2 x 5  summer well   Prone press ups with sag Add as indicated         Standing back ext                     Therapeutic Activity (19871) Min:  20     Reviewed home exer, posture and body mechanics Review of body mechanics, lifting kids, resting at home, driving posture and body mechanics at work Given lumbar roll                  NMR re-education (19307)   Min:                         Manual Intervention ([id number]) Min:      Mobilizations   PA to lumbar  Hip Distraction mobilization - left Centralized symptoms   Prone manual B LE traction  With towel and belt   STM  To lumbar paraspinals    Modalities  Min:        E-stim    (sit) IFC with MH x 15 min        Other Therapeutic Activities:    Pt was educated on PT POC, Diagnosis, Prognosis, pathomechanics as well as frequency and duration of scheduling future physical therapy appointments. Time was also taken on this day to answer all patient questions and participation in PT. Reviewed appointment policy in detail with patient and patient verbalized understanding.     6/22/21: Educated patient on what activities to avoid during the day, flexion. Recommended try pillow when driving. Recommended pt get prone for at least 5 min and to do press ups every hour.  Recommended pt avoid bending or sitting at all cost.     Home Exercise Program: Patient instructed in the following for HEP:    . Patient verbalized/demonstrated understanding and was issued written handout. Therapeutic Exercise and NMR EXR  [x] (13359) Provided verbal/tactile cueing for activities related to strengthening, flexibility, endurance, ROM  for improvements in proximal hip and core control with self care, mobility, lifting and ambulation.  [] (01272) Provided verbal/tactile cueing for activities related to improving balance, coordination, kinesthetic sense, posture, motor skill, proprioception  to assist with core control in self care, mobility, lifting, and ambulation. Therapeutic Activities:    [x] (00009 or 43039) Provided verbal/tactile cueing for activities related to improving balance, coordination, kinesthetic sense, posture, motor skill, proprioception and motor activation to allow for proper function  with self care and ADLs  [] (68570) Provided training and instruction to the patient for proper core and proximal hip recruitment and positioning with ambulation re-education     Home Exercise Program:    [x] (75627) Reviewed/Progressed HEP activities related to strengthening, flexibility, endurance, ROM of core, proximal hip and LE for functional self-care, mobility, lifting and ambulation   [] (80352) Reviewed/Progressed HEP activities related to improving balance, coordination, kinesthetic sense, posture, motor skill, proprioception of core, proximal hip and LE for self care, mobility, lifting, and ambulation      Manual Treatments:  PROM / STM / Oscillations-Mobs:  G-I, II, III, IV (PA's, Inf., Post.)  [x] (09700) Provided manual therapy to mobilize proximal hip and LS spine soft tissue/joints for the purpose of modulating pain, promoting relaxation,  increasing ROM, reducing/eliminating soft tissue swelling/inflammation/restriction, improving soft tissue extensibility and allowing for proper ROM for normal function with self care, mobility, lifting and ambulation.            Charges:  Timed Code Treatment Minutes: 98 Total Treatment Minutes: 62     [] EVAL (LOW) 69890 (typically 20 minutes face-to-face)  [] EVAL (MOD) 49188 (typically 30 minutes face-to-face)  [] EVAL (HIGH) 11663 (typically 45 minutes face-to-face)  [] RE-EVAL     [] GR(41095) x     [] Dry needle 1 or 2 Muscles (89325)  [] NMR (36820) x     [] Dry needle 3+ Muscles (15219)  [x] Manual (99774) x 2     [] Ultrasound (57880) x  [] TA (52969) x     [] Mech Traction (83061)  [] ES(attended) (49243)     [x] ES (un) (42354):   [] Vasopump (96382)  [] Ionto (51888)   [] Other:    GOALS:  Patient stated goal:   []? Progressing: []? Met: []? Not Met: []? Adjusted  Therapist goals for Patient:   Short Term Goals: To be achieved in: 2 weeks  1. Independent in HEP and progression per patient tolerance, in order to prevent re-injury. []? Progressing: []? Met: []? Not Met: []? Adjusted  2. Patient will have a decrease in pain to facilitate improvement in movement, function, and ADLs as indicated by Functional Deficits. []? Progressing: []? Met: []? Not Met: []? Adjusted     Long Term Goals: To be achieved in: 6 weeks  1. Disability index score of 20% or less for the GERRY to assist with reaching prior level of function. []? Progressing: []? Met: []? Not Met: []? Adjusted  2. Patient will demonstrate increased AROM to WNL, good LS mobility, good hip ROM to allow for proper joint functioning as indicated by patients Functional Deficits. []? Progressing: []? Met: []? Not Met: []? Adjusted  3. Patient will demonstrate an increase in Strength to good proximal hip and core activation to allow for proper functional mobility as indicated by patients Functional Deficits. []? Progressing: []? Met: []? Not Met: []? Adjusted  4. Patient will return to usual functional activities without increased symptoms or restriction. []? Progressing: []? Met: []? Not Met: []?  Adjusted    ASSESSMENT:   Prone press up and hip distraction centralized symptoms    Treatment/Activity Tolerance:  [x] Patient tolerated treatment well [] Patient limited by fatique  [] Patient limited by pain  [] Patient limited by other medical complications  [] Other:     Overall Progression Towards Functional goals/ Treatment Progress Update:  [x] Patient is progressing as expected towards functional goals listed. [] Progression is slowed due to complexities/Impairments listed. [] Progression has been slowed due to co-morbidities. [] Plan just implemented, too soon to assess goals progression <30days   [] Goals require adjustment due to lack of progress  [] Patient is not progressing as expected and requires additional follow up with physician  [] Other:    Prognosis for POC: [x] Good [] Fair  [] Poor    Patient requires continued skilled intervention: [x] Yes  [] No        PLAN:Extension/body mechanics/ posture  [x] Continue per plan of care [] Alter current plan (see comments)  [] Plan of care initiated [] Hold pending MD visit [] Discharge    Electronically signed by: Deshawn Sam, PT DPt     Note: If patient does not return for scheduled/recommended follow up visits, this note will serve as a discharge from care along with the most recent update on progress.

## 2021-07-01 NOTE — TELEPHONE ENCOUNTER
Other Patient is seeing Pollo Brown for his back but wanted to be seen at a later than 245.  ph [phone]

## 2021-07-08 ENCOUNTER — HOSPITAL ENCOUNTER (OUTPATIENT)
Dept: PHYSICAL THERAPY | Age: 36
Setting detail: THERAPIES SERIES
Discharge: HOME | End: 2021-07-08
Payer: COMMERCIAL

## 2021-07-08 ENCOUNTER — OFFICE VISIT (OUTPATIENT)
Dept: ORTHOPEDIC SURGERY | Age: 36
End: 2021-07-08
Payer: COMMERCIAL

## 2021-07-08 VITALS — WEIGHT: 220 LBS | BODY MASS INDEX: 30.8 KG/M2 | HEIGHT: 71 IN

## 2021-07-08 DIAGNOSIS — M54.16 LUMBAR RADICULAR PAIN: Primary | ICD-10-CM

## 2021-07-08 PROCEDURE — 97110 THERAPEUTIC EXERCISES: CPT

## 2021-07-08 PROCEDURE — G8427 DOCREV CUR MEDS BY ELIG CLIN: HCPCS | Performed by: PHYSICIAN ASSISTANT

## 2021-07-08 PROCEDURE — 99213 OFFICE O/P EST LOW 20 MIN: CPT | Performed by: PHYSICIAN ASSISTANT

## 2021-07-08 PROCEDURE — G8417 CALC BMI ABV UP PARAM F/U: HCPCS | Performed by: PHYSICIAN ASSISTANT

## 2021-07-08 PROCEDURE — 97140 MANUAL THERAPY 1/> REGIONS: CPT

## 2021-07-08 PROCEDURE — 1036F TOBACCO NON-USER: CPT | Performed by: PHYSICIAN ASSISTANT

## 2021-07-08 NOTE — FLOWSHEET NOTE
[facility]  [address] [place]  Phone: [phone]   Fax:     [phone]    Physical Therapy Treatment Note/ Progress Report:     Date:  2021    Patient Name:  Tmomy Reno    :  1985  MRN: [id number]    Pertinent Medical History: Additional Pertinent Hx: Heart Surgery (Age 5)    Medical/Treatment Diagnosis Information:  · Diagnosis: Lumbar Radicular pain  · Treatment Diagnosis: Pain mainly on L side LB and L buttock    Insurance/Certification information:   [address]  Physician Information:     JOSHUA García  Plan of care signed (Y/N): Inbox    Date of Patient follow up with Physician:      Progress Report: []  Yes  [x]  No     Date Range for reporting period:  Beginnin2021  Ending:    Progress report due (10 Rx/or 30 days whichever is less):     Recertification due (POC duration/ or 90 days whichever is less):    Visit # POC/ Insurance Allowable Auth Needed   - 30 []Yes    [x]No     Functional Outcomes Measure:   Date Assessed: at eval  Test: [place]   Score: 60    Pain level:  6/10     History of Injury:   Injured back bending and lifting ~ 3 weeks ago. He was beginning to feel better and re-injured back while bending and lifting. Began Medrol pack yesterday    SUBJECTIVE:  C/o pain in LB radiating into L buttock  21: States not sure if PT is helping or not. Thinks he should get MRI. States is having to drive a lot at work and is aggravating symptoms. Exercises centralize symptoms but he is not able to do it much  21: Pain is in back and down leg right now. Pain is 6/10 in am, best of the day.  21: Patient reports leg pain is not as frequent,back still sore. States driving tends to provoke the symptoms. 21: Pt states he is doing a little better, has been laying prone more often.  Driving still really bothers it  21: Pt states pain is about 6/10, spasms are not as often and the leg pain is not as often but still feels both. Is better than when he started PT but still has pain. Saw MD today about MRI. OBJECTIVE:    Observation: B tight hamstrings (50 degrees SLR)   Test measurements:      RESTRICTIONS/PRECAUTIONS:     Exercises/Interventions: Able to decrease and centralize symptoms with supine manual  B LE traction    Therapeutic Ex (58097)   Min: 30 Resistance/Reps Notes/Cues        Prone  Pain came out of leg  Min LBP           Prone on elbows  pain continued to centralize         Prone press ups 4 x 5 no leg pain Given written HEP   Prone press ups with OP summer well   Prone press ups with sag         Standing back ext                     Therapeutic Activity (29588) Min:  20     Reviewed home exer, posture and body mechanics Review of body mechanics, lifting kids, resting at home, driving posture and body mechanics at work Given lumbar roll                  NMR re-education (39757)   Min:                         Manual Intervention (93722) Min:      Mobilizations   PA and Rotational mobilizations to lumbar    Hip Distraction mobilization - left Centralized symptoms   Prone manual B LE traction  With towel and belt   STM  To lumbar paraspinals    Modalities  Min:        E-stim    (sit)         Other Therapeutic Activities:    Pt was educated on PT POC, Diagnosis, Prognosis, pathomechanics as well as frequency and duration of scheduling future physical therapy appointments. Time was also taken on this day to answer all patient questions and participation in PT. Reviewed appointment policy in detail with patient and patient verbalized understanding.     6/22/21: Educated patient on what activities to avoid during the day, flexion. Recommended try pillow when driving. Recommended pt get prone for at least 5 min and to do press ups every hour.  Recommended pt avoid bending or sitting at all cost.     Home Exercise Program: Patient instructed in the following for HEP:    . Patient verbalized/demonstrated understanding and was issued written handout. Therapeutic Exercise and NMR EXR  [x] (79652) Provided verbal/tactile cueing for activities related to strengthening, flexibility, endurance, ROM  for improvements in proximal hip and core control with self care, mobility, lifting and ambulation.  [] (29246) Provided verbal/tactile cueing for activities related to improving balance, coordination, kinesthetic sense, posture, motor skill, proprioception  to assist with core control in self care, mobility, lifting, and ambulation. Therapeutic Activities:    [x] (49302 or 19600) Provided verbal/tactile cueing for activities related to improving balance, coordination, kinesthetic sense, posture, motor skill, proprioception and motor activation to allow for proper function  with self care and ADLs  [] (13385) Provided training and instruction to the patient for proper core and proximal hip recruitment and positioning with ambulation re-education     Home Exercise Program:    [x] (00212) Reviewed/Progressed HEP activities related to strengthening, flexibility, endurance, ROM of core, proximal hip and LE for functional self-care, mobility, lifting and ambulation   [] (28802) Reviewed/Progressed HEP activities related to improving balance, coordination, kinesthetic sense, posture, motor skill, proprioception of core, proximal hip and LE for self care, mobility, lifting, and ambulation      Manual Treatments:  PROM / STM / Oscillations-Mobs:  G-I, II, III, IV (PA's, Inf., Post.)  [x] (18284) Provided manual therapy to mobilize proximal hip and LS spine soft tissue/joints for the purpose of modulating pain, promoting relaxation,  increasing ROM, reducing/eliminating soft tissue swelling/inflammation/restriction, improving soft tissue extensibility and allowing for proper ROM for normal function with self care, mobility, lifting and ambulation. Charges:  Timed Code Treatment Minutes: 51   Total Treatment Minutes: 51     [] EVAL (LOW) 85667 (typically 20 minutes face-to-face)  [] EVAL (MOD) 01386 (typically 30 minutes face-to-face)  [] EVAL (HIGH) 15642 (typically 45 minutes face-to-face)  [] RE-EVAL     [x] SQ(33391) x     [] Dry needle 1 or 2 Muscles (55423)  [] NMR (45021) x     [] Dry needle 3+ Muscles (49221)  [x] Manual (16733) x 2     [] Ultrasound (61267) x  [] TA (43961) x     [] Mech Traction (02592)  [] ES(attended) (72899)     [x] ES (un) (32234):   [] Vasopump (03650)  [] Ionto (47195)   [] Other:    GOALS:  Patient stated goal:   []? Progressing: []? Met: []? Not Met: []? Adjusted  Therapist goals for Patient:   Short Term Goals: To be achieved in: 2 weeks  1. Independent in HEP and progression per patient tolerance, in order to prevent re-injury. []? Progressing: []? Met: []? Not Met: []? Adjusted  2. Patient will have a decrease in pain to facilitate improvement in movement, function, and ADLs as indicated by Functional Deficits. []? Progressing: []? Met: []? Not Met: []? Adjusted     Long Term Goals: To be achieved in: 6 weeks  1. Disability index score of 20% or less for the GERRY to assist with reaching prior level of function. []? Progressing: []? Met: []? Not Met: []? Adjusted  2. Patient will demonstrate increased AROM to WNL, good LS mobility, good hip ROM to allow for proper joint functioning as indicated by patients Functional Deficits. []? Progressing: []? Met: []? Not Met: []? Adjusted  3. Patient will demonstrate an increase in Strength to good proximal hip and core activation to allow for proper functional mobility as indicated by patients Functional Deficits. []? Progressing: []? Met: []? Not Met: []? Adjusted  4. Patient will return to usual functional activities without increased symptoms or restriction. []? Progressing: []? Met: []? Not Met: []?  Adjusted    ASSESSMENT:   Prone press up and hip distraction centralized symptoms    Treatment/Activity Tolerance:  [x] Patient tolerated treatment well [] Patient limited by fatique  [] Patient limited by pain  [] Patient limited by other medical complications  [] Other:     Overall Progression Towards Functional goals/ Treatment Progress Update:  [x] Patient is progressing as expected towards functional goals listed. [] Progression is slowed due to complexities/Impairments listed. [] Progression has been slowed due to co-morbidities. [] Plan just implemented, too soon to assess goals progression <30days   [] Goals require adjustment due to lack of progress  [] Patient is not progressing as expected and requires additional follow up with physician  [] Other:    Prognosis for POC: [x] Good [] Fair  [] Poor    Patient requires continued skilled intervention: [x] Yes  [] No        PLAN:Extension/body mechanics/ posture  [x] Continue per plan of care [] Alter current plan (see comments)  [] Plan of care initiated [] Hold pending MD visit [] Discharge    Electronically signed by: Tanisha Choi PT DPt     Note: If patient does not return for scheduled/recommended follow up visits, this note will serve as a discharge from care along with the most recent update on progress.

## 2021-07-09 NOTE — PROGRESS NOTES
This dictation was done with [employer] dictation and may contain mechanical errors related to translation. I have today reviewed with Bette Mims the clinically relevant, past medical history, medications, allergies, family history, social history, and Review Of Systems form the patients most recent history form & I have documented any details relevant to today's presenting complaints in my history below. Mr. Taty Ramírez's self-reported past medical history, medications, allergies, family history, social history, and Review Of Systems form has been scanned into the chart under the \"Media\" tab. Subjective:  Bette Mims is a 30 y. o. who is here in follow-up for his lower back pain. He was seen on 6/10/2021 we tried a dose of oral steroids and some exercises also be failure modification. He states he has a 6 out of 10 pain and definitely referred pain down the left side along the L5 dermatome. There is no problem list on file for this patient. Current Outpatient Medications on File Prior to Visit   Medication Sig Dispense Refill    methylPREDNISolone (MEDROL, BRENDA,) 4 MG tablet Take by mouth as directed on package. (Patient not taking: Reported on 7/8/2021) 1 kit 0     No current facility-administered medications on file prior to visit. Objective:   Height 5' 11\" (1.803 m), weight 220 lb (99.8 kg). On examination today he does have a positive straight leg raise more on the left than the right around 40 degrees. He is got a little hyper reflexive distal reflexes in both knees and no cutaneous lesions or lymphadenopathy present  Neuro exam grossly intact both lower extremities. Intact sensation to light touch. Motor exam 4+ to 5/5 in all major motor groups. Negative Karimi's sign. Skin is warm, dry and intact with out erythema or significant increased temperature around the knee joint(s).      There are no cutaneous lesions or lymphadenopathy present. X-RAYS:  Previous x-rays show very minimal degenerative changes in the lower lumbar spine. Number taken today      Assessment:  L5 herniated disc with radicular pain    Plan:  During today's visit, there was approximately 25 minutes of face-to-face discussion in regards to the patient's current condition and treatment options. More than 50 % of the time was counseling and coordination of care as indicated above.   We talked about short and long-term expectations and at this point I think an MRI is warranted to look at the integrity of the disc space in the lumbar region      PROCEDURE NOTE:   Order MRI get authorization      They will schedule a follow up in 1 to 2 weeks

## 2021-07-19 ENCOUNTER — TELEPHONE (OUTPATIENT)
Dept: ORTHOPEDIC SURGERY | Age: 36
End: 2021-07-19

## 2021-07-27 ENCOUNTER — TELEPHONE (OUTPATIENT)
Dept: ORTHOPEDIC SURGERY | Age: 36
End: 2021-07-27

## 2021-07-27 DIAGNOSIS — M54.16 LUMBAR RADICULAR PAIN: Primary | ICD-10-CM

## 2021-07-27 DIAGNOSIS — M62.830 LUMBAR PARASPINAL MUSCLE SPASM: ICD-10-CM

## 2021-07-27 NOTE — TELEPHONE ENCOUNTER
General Question     Subject: Patient advised he received a call from [facility] advising him to follow up for injections with a spine provider.  Patient was unsure of the name of the provider please advise  Patient Radha Eric  Contact Number: [phone]

## 2021-09-07 ENCOUNTER — OFFICE VISIT (OUTPATIENT)
Dept: PRIMARY CARE CLINIC | Age: 36
End: 2021-09-07
Payer: COMMERCIAL

## 2021-09-07 VITALS
HEART RATE: 79 BPM | SYSTOLIC BLOOD PRESSURE: 129 MMHG | DIASTOLIC BLOOD PRESSURE: 74 MMHG | RESPIRATION RATE: 18 BRPM | WEIGHT: 236 LBS | BODY MASS INDEX: 33.04 KG/M2 | HEIGHT: 71 IN | OXYGEN SATURATION: 96 %

## 2021-09-07 DIAGNOSIS — M54.40 BACK PAIN OF LUMBAR REGION WITH SCIATICA: ICD-10-CM

## 2021-09-07 DIAGNOSIS — K21.9 CHRONIC GERD: ICD-10-CM

## 2021-09-07 DIAGNOSIS — S02.91XB OPEN DEPRESSED FRACTURE OF SKULL, INITIAL ENCOUNTER (HCC): ICD-10-CM

## 2021-09-07 DIAGNOSIS — S02.19XB OPEN FRACTURE OF TEMPORAL BONE, INITIAL ENCOUNTER (HCC): ICD-10-CM

## 2021-09-07 DIAGNOSIS — E66.9 OBESITY (BMI 30.0-34.9): ICD-10-CM

## 2021-09-07 DIAGNOSIS — Z76.89 ENCOUNTER TO ESTABLISH CARE: Primary | ICD-10-CM

## 2021-09-07 PROCEDURE — 1036F TOBACCO NON-USER: CPT | Performed by: FAMILY MEDICINE

## 2021-09-07 PROCEDURE — 99204 OFFICE O/P NEW MOD 45 MIN: CPT | Performed by: FAMILY MEDICINE

## 2021-09-07 PROCEDURE — G8427 DOCREV CUR MEDS BY ELIG CLIN: HCPCS | Performed by: FAMILY MEDICINE

## 2021-09-07 PROCEDURE — G8417 CALC BMI ABV UP PARAM F/U: HCPCS | Performed by: FAMILY MEDICINE

## 2021-09-07 RX ORDER — BACLOFEN 10 MG/1
10 TABLET ORAL 3 TIMES DAILY
Qty: 30 TABLET | Refills: 1 | Status: SHIPPED | OUTPATIENT
Start: 2021-09-07 | End: 2021-12-08

## 2021-09-07 RX ORDER — LANOLIN ALCOHOL/MO/W.PET/CERES
3 CREAM (GRAM) TOPICAL NIGHTLY
COMMUNITY
Start: 2021-08-30

## 2021-09-07 RX ORDER — GABAPENTIN 300 MG/1
300 CAPSULE ORAL 3 TIMES DAILY PRN
COMMUNITY
Start: 2021-08-17

## 2021-09-07 RX ORDER — OXYCODONE HYDROCHLORIDE 5 MG/1
5 TABLET ORAL EVERY 6 HOURS PRN
COMMUNITY
Start: 2021-08-30 | End: 2021-09-13

## 2021-09-07 RX ORDER — PANTOPRAZOLE SODIUM 40 MG/1
40 TABLET, DELAYED RELEASE ORAL
Qty: 30 TABLET | Refills: 5 | Status: SHIPPED | OUTPATIENT
Start: 2021-09-07

## 2021-09-07 RX ORDER — ACETAMINOPHEN 325 MG/1
650 TABLET ORAL EVERY 6 HOURS PRN
COMMUNITY
Start: 2021-08-30

## 2021-09-07 RX ORDER — TIZANIDINE 4 MG/1
4 TABLET ORAL 3 TIMES DAILY PRN
COMMUNITY
Start: 2021-08-17 | End: 2021-09-07

## 2021-09-07 SDOH — ECONOMIC STABILITY: FOOD INSECURITY: WITHIN THE PAST 12 MONTHS, YOU WORRIED THAT YOUR FOOD WOULD RUN OUT BEFORE YOU GOT MONEY TO BUY MORE.: NEVER TRUE

## 2021-09-07 SDOH — ECONOMIC STABILITY: FOOD INSECURITY: WITHIN THE PAST 12 MONTHS, THE FOOD YOU BOUGHT JUST DIDN'T LAST AND YOU DIDN'T HAVE MONEY TO GET MORE.: NEVER TRUE

## 2021-09-07 ASSESSMENT — SOCIAL DETERMINANTS OF HEALTH (SDOH): HOW HARD IS IT FOR YOU TO PAY FOR THE VERY BASICS LIKE FOOD, HOUSING, MEDICAL CARE, AND HEATING?: SOMEWHAT HARD

## 2021-09-07 ASSESSMENT — ENCOUNTER SYMPTOMS
BLOOD IN STOOL: 0
VOICE CHANGE: 0
TROUBLE SWALLOWING: 0
DIARRHEA: 0
ABDOMINAL PAIN: 0
SHORTNESS OF BREATH: 0
CONSTIPATION: 0

## 2021-09-07 ASSESSMENT — PATIENT HEALTH QUESTIONNAIRE - PHQ9
1. LITTLE INTEREST OR PLEASURE IN DOING THINGS: 1
2. FEELING DOWN, DEPRESSED OR HOPELESS: 1
SUM OF ALL RESPONSES TO PHQ QUESTIONS 1-9: 2
SUM OF ALL RESPONSES TO PHQ9 QUESTIONS 1 & 2: 2
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
## 2021-09-27 RX ORDER — OXYCODONE HYDROCHLORIDE 5 MG/1
5 CAPSULE ORAL EVERY 4 HOURS PRN
COMMUNITY
End: 2021-12-08

## 2021-09-27 NOTE — PROGRESS NOTES
PATIENT REACHED   YES__X__NO____    PREOP INSTRUCTIONS LEFT ON VM NUMBER_______________      DATE__9/28/21_______ TIME_1000________ARRIVAL__0900______PLACE__[facility]__________  NOTHING TO EAT OR DRINK  AFTER MIDNIGHT THE EVENING PRIOR OR AS INSTRUCTED BY YOUR DR.  Sandee Light NEED A RESPONSIBLE ADULT AGE 18 OR OLDER TO DRIVE YOU HOME  PLEASE BRING INSURANCE CARD. PICTURE ID AND COMPLETE LIST OF MEDS  WEAR LOOSE COMFORTABLE CLOTHING  FOLLOW ANY INSTRUCTIONS YOUR DRS OFFICE HAS GIVEN YOU,INCLUDING WHAT MEDICATIONS TO TAKE THE AM OF PROCEDURE AND WHEN AND IF YOU NEED TO STOP ANY BLOOD THINNERS. IF YOU HAVE QUESTIONS REGARDING THIS CALL THE OFFICE  THE GOAL BLOOD SUGAR THE AM OF PROCEDURE  OR LESS ABOVE THAT THE PROCEDURE MAY BE CANCELLED  ANY QUESTIONS CALL YOUR DOCTOR. ALSO,PLEASE READ THE INSTRUCTION PACKET FROM YOUR DR IF YOU RECEIVED ONE. SPINE INTERVENTION NUMBER [phone]      OTHER___________________________________      VISITOR POLICY(subject to change)      There is a one visitor policy at [facility] for all surgeries and endoscopies. Whether the visitor can stay or will be asked to wait in the car will depend on the current policy and if social distancing can be maintained. The policy is subject to change at any time. Please make sure the visitor has a cell phone that is on,charged and able to accept calls, as this may be the way that the staff communicates with them. Pain management is NO VISITOR policyThe patients ride is expected to remain in the car with a cell phone for communication. If the ride is leaving the hospital grounds please make sure they are back in time for pickup. Have the patient inform the staff on arrival what their rides plans are while the patient is in the facility. At the MAIN there is one visitor allowed. Please note that the visitor policy is subject to change.

## 2021-09-28 ENCOUNTER — APPOINTMENT (OUTPATIENT)
Dept: GENERAL RADIOLOGY | Age: 36
End: 2021-09-28
Attending: ANESTHESIOLOGY
Payer: COMMERCIAL

## 2021-09-28 ENCOUNTER — HOSPITAL ENCOUNTER (OUTPATIENT)
Age: 36
Setting detail: OUTPATIENT SURGERY
Discharge: HOME | End: 2021-09-28
Attending: ANESTHESIOLOGY | Admitting: ANESTHESIOLOGY
Payer: COMMERCIAL

## 2021-09-28 VITALS
SYSTOLIC BLOOD PRESSURE: 116 MMHG | TEMPERATURE: 97 F | RESPIRATION RATE: 14 BRPM | BODY MASS INDEX: 30.8 KG/M2 | WEIGHT: 220 LBS | HEART RATE: 73 BPM | DIASTOLIC BLOOD PRESSURE: 84 MMHG | OXYGEN SATURATION: 100 % | HEIGHT: 71 IN

## 2021-09-28 PROCEDURE — 3610000054 HC PAIN LEVEL 3 BASE (NON-OR): Performed by: ANESTHESIOLOGY

## 2021-09-28 PROCEDURE — 99152 MOD SED SAME PHYS/QHP 5/>YRS: CPT | Performed by: ANESTHESIOLOGY

## 2021-09-28 PROCEDURE — 6360000002 HC RX W HCPCS: Performed by: ANESTHESIOLOGY

## 2021-09-28 PROCEDURE — 2709999900 HC NON-CHARGEABLE SUPPLY: Performed by: ANESTHESIOLOGY

## 2021-09-28 PROCEDURE — 3209999900 FLUORO FOR SURGICAL PROCEDURES

## 2021-09-28 RX ORDER — MIDAZOLAM HYDROCHLORIDE 1 MG/ML
INJECTION INTRAMUSCULAR; INTRAVENOUS
Status: COMPLETED | OUTPATIENT
Start: 2021-09-28 | End: 2021-09-28

## 2021-09-28 RX ORDER — FENTANYL CITRATE 50 UG/ML
INJECTION, SOLUTION INTRAMUSCULAR; INTRAVENOUS
Status: COMPLETED | OUTPATIENT
Start: 2021-09-28 | End: 2021-09-28

## 2021-09-28 RX ORDER — METHYLPREDNISOLONE ACETATE 80 MG/ML
INJECTION, SUSPENSION INTRA-ARTICULAR; INTRALESIONAL; INTRAMUSCULAR; SOFT TISSUE
Status: COMPLETED | OUTPATIENT
Start: 2021-09-28 | End: 2021-09-28

## 2021-09-28 ASSESSMENT — PAIN DESCRIPTION - DESCRIPTORS: DESCRIPTORS: PRESSURE;DISCOMFORT

## 2021-09-28 ASSESSMENT — PAIN - FUNCTIONAL ASSESSMENT
PAIN_FUNCTIONAL_ASSESSMENT: 0-10
PAIN_FUNCTIONAL_ASSESSMENT: PREVENTS OR INTERFERES SOME ACTIVE ACTIVITIES AND ADLS

## 2021-09-28 ASSESSMENT — PAIN SCALES - GENERAL
PAINLEVEL_OUTOF10: 0
PAINLEVEL_OUTOF10: 0

## 2021-09-29 NOTE — H&P
Update History & Physical    The patient's History and Physical of September 21,  was reviewed with the patient and I examined the patient. There was no change. The surgical site was confirmed by the patient and me. Plan: The risks, benefits, expected outcome, and alternative to the recommended procedure have been discussed with the patient. Patient understands and wants to proceed with the procedure.      Electronically signed by Karan Gamez MD on 9/28/2021 at 8:26 PM

## 2021-09-29 NOTE — OP NOTE
Operative Note      Patient: Marjorie Esteban  YOB: 1985  MRN: [id number]    Date of Procedure: 9/28/2021    Pre-Op Diagnosis: M54.16 RADICULOPATHY, LUMBAR REGION    Post-Op Diagnosis: Same       Procedure(s):  L5- S1 LUMBAR EPIDURAL STEROID INJECTION WITH FLUOROSCOPY    Surgeon(s):  Min Sargent MD    Assistant:   * No surgical staff found *    Anesthesia: IV Sedation    Estimated Blood Loss (mL): Minimal    Complications: None    Specimens:   * No specimens in log *    Implants:  * No implants in log *      Drains: * No LDAs found *    Findings:     Detailed Description of Procedure:   Procedure in Detail:   The patient's chart was reviewed. After obtaining written informed consent, the patient had a 20 g IV placed, and NS was running at 30 ml/hour, the patient was placed in the supine position with the leg slightly flexed. Versed and Fentanyl was given to the patient intravenous, a NC at 4 l was placed and monitors attached. Pre-procedure blood pressure and pulse were stable and recorded in patients clinic chart. MEDICAL NECESSITY: This patient has chronic pain that has failed to respond to conservative measures as outlined in the original history and last physical exam.   The patient's symptoms include Pain and disability of a moderate to severe degree with intermittent refereed pain. The goals of treatment are to 1) achieve optimal pain control, recognizing that a pain-free state may not be achievable; 2) minimize adverse outcomes; 3) enhance functional abilities, and physical and psychological well-being; and 4) enhance the quality of life for patients with chronic pain.   The patient has documented pathology through radiographic imaging, the patient has the same or similar procedure in the past which resulted in significant improvement more than 50% reduction in the pain, as well improvement in their Activity of daily living and quality of life, and this would be the goal for the first time procedure. PROCEDURE:   The patient was placed in the prone position on fluoroscopy table. The lower back was prepped with antiseptic solution and draped in the usual sterile fashion. The skin over the L5-S1  was identified under fluoroscopic guidance and infiltrated with 3 ml of 1% Lidocaine for local anesthesia via 25 gauge needle. An 18-gauge Touhy needle, was used under fluoroscopic guidance to access the epidural space using loss of resistance to air technique. Approximately 3 ml of Omnipeque 300 contrast was injected under continuous fluoroscopic guidance and good spread was noted following a negative aspiration  Following negative aspiration, a mixture of 4 ml Preservative free normal Saline and Depomedrol 80 mg = 2 ml was injected with minimal pressure. There was no evidence of CSF, paresthesia or heme. The needle was cleared with preservative free local anesthetic and removed. Skin was cleaned and a sterile dressing was applied. Following the procedure the patient's vital signs were stable. The patient was discharged home in good condition after being given discharge instructions.     Electronically signed by Karan Gamez MD on 9/28/2021 at 8:25 PM

## 2021-12-08 ENCOUNTER — OFFICE VISIT (OUTPATIENT)
Dept: PRIMARY CARE CLINIC | Age: 36
End: 2021-12-08
Payer: COMMERCIAL

## 2021-12-08 VITALS
BODY MASS INDEX: 33.75 KG/M2 | DIASTOLIC BLOOD PRESSURE: 75 MMHG | HEART RATE: 102 BPM | WEIGHT: 242 LBS | OXYGEN SATURATION: 98 % | RESPIRATION RATE: 18 BRPM | SYSTOLIC BLOOD PRESSURE: 119 MMHG

## 2021-12-08 DIAGNOSIS — R10.13 DYSPEPSIA: ICD-10-CM

## 2021-12-08 DIAGNOSIS — E66.9 OBESITY (BMI 30.0-34.9): ICD-10-CM

## 2021-12-08 DIAGNOSIS — M54.50 CHRONIC BILATERAL LOW BACK PAIN WITHOUT SCIATICA: ICD-10-CM

## 2021-12-08 DIAGNOSIS — G89.29 CHRONIC BILATERAL LOW BACK PAIN WITHOUT SCIATICA: ICD-10-CM

## 2021-12-08 DIAGNOSIS — Z00.00 PREVENTATIVE HEALTH CARE: Primary | ICD-10-CM

## 2021-12-08 DIAGNOSIS — K62.9 ANAL LESION: ICD-10-CM

## 2021-12-08 PROBLEM — E66.811 OBESITY (BMI 30.0-34.9): Status: ACTIVE | Noted: 2021-12-08

## 2021-12-08 PROCEDURE — 99395 PREV VISIT EST AGE 18-39: CPT | Performed by: FAMILY MEDICINE

## 2021-12-08 PROCEDURE — G8484 FLU IMMUNIZE NO ADMIN: HCPCS | Performed by: FAMILY MEDICINE

## 2021-12-08 ASSESSMENT — ENCOUNTER SYMPTOMS
TROUBLE SWALLOWING: 0
VOICE CHANGE: 0
SHORTNESS OF BREATH: 0
CONSTIPATION: 0
ABDOMINAL PAIN: 0
BLOOD IN STOOL: 0
DIARRHEA: 0
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
## 2023-06-30 ENCOUNTER — OFFICE VISIT (OUTPATIENT)
Dept: PRIMARY CARE CLINIC | Age: 38
End: 2023-06-30

## 2023-06-30 VITALS
DIASTOLIC BLOOD PRESSURE: 75 MMHG | SYSTOLIC BLOOD PRESSURE: 127 MMHG | RESPIRATION RATE: 18 BRPM | BODY MASS INDEX: 33.33 KG/M2 | WEIGHT: 239 LBS | OXYGEN SATURATION: 95 % | HEART RATE: 81 BPM

## 2023-06-30 DIAGNOSIS — S83.242A TEAR OF MEDIAL MENISCUS OF LEFT KNEE, UNSPECIFIED TEAR TYPE, UNSPECIFIED WHETHER OLD OR CURRENT TEAR, INITIAL ENCOUNTER: ICD-10-CM

## 2023-06-30 DIAGNOSIS — R53.82 CHRONIC FATIGUE: Primary | ICD-10-CM

## 2023-06-30 DIAGNOSIS — Z01.818 PREOP EXAMINATION: Primary | ICD-10-CM

## 2023-06-30 DIAGNOSIS — Z00.00 PREVENTATIVE HEALTH CARE: ICD-10-CM

## 2023-06-30 ASSESSMENT — PATIENT HEALTH QUESTIONNAIRE - PHQ9
SUM OF ALL RESPONSES TO PHQ QUESTIONS 1-9: 0
SUM OF ALL RESPONSES TO PHQ9 QUESTIONS 1 & 2: 0
SUM OF ALL RESPONSES TO PHQ QUESTIONS 1-9: 0
SUM OF ALL RESPONSES TO PHQ QUESTIONS 1-9: 0
1. LITTLE INTEREST OR PLEASURE IN DOING THINGS: 0
2. FEELING DOWN, DEPRESSED OR HOPELESS: 0
SUM OF ALL RESPONSES TO PHQ QUESTIONS 1-9: 0

## 2025-05-28 ENCOUNTER — OFFICE VISIT (OUTPATIENT)
Age: 40
End: 2025-05-28

## 2025-05-28 DIAGNOSIS — S61.211A LACERATION OF LEFT INDEX FINGER WITHOUT FOREIGN BODY WITHOUT DAMAGE TO NAIL, INITIAL ENCOUNTER: Primary | ICD-10-CM

## 2025-05-28 NOTE — PATIENT INSTRUCTIONS
Keep wound clean and dry, particularly the first 24 hours. You may shower but be conscious of the wound.     Apply over-the-counter topical antibiotic ointment such as polysporin or bacitracin twice daily for the first 48 hours then leave open to air    Monitor for signs of infection including increased redness, swelling, yellowish green discharge. Return for wound check if this occurs.     Sutures may be removed in approximately 10-14 days.   Sutures can be removed by any qualified health care professional including primary care or returning to here to the clinic.       Darci,    Thank you for trusting [facility] with your care. Your decision to come to us means a lot and we are honored to be part of your healthcare journey. We value your trust and hope your experience with us was positive and met your expectations.    We're always looking for ways to improve, and your feedback is incredibly important to us. You will receive a text or email soon asking you how your visit went. for If you could take a moment to share your thoughts, it would mean the world to us. Your input helps us better serve you and others in the community.     Thank you again for choosing us. We're grateful for the opportunity to care for you and your loved ones. We hope to see you again - though we always wish you health and wellness!    Warm regards,    The [facility] Team    Nidhi PARSON, Rachel Leavitt CMA, and Gisella GUERRERO

## 2025-05-28 NOTE — PROGRESS NOTES
Darci Ramírez (: 1985) is a 39 y.o. male, New patient, here for evaluation of the following chief complaint(s):  Finger Laceration (1st digit finger on L hand,finger sliced with a piece of metal - incident occurred about hour ago . Last Tdap )      Visit date not found    ASSESSMENT/PLAN:    ICD-10-CM    1. Laceration of left index finger without foreign body without damage to nail, initial encounter  S61.211A         Laceration  Wound was closed with 6 sutures, see procedure note below for details  Tdap up to date  Extensive wound care instructions given verbally and on AVS  Instructed to return in 10 to 14 days for suture removal  Instructed to monitor for signs and symptoms of infection  Recommended Tylenol or ibuprofen to help manage pain and inflammation  Suggested to keep the wound elevated      Discussed PCP follow up for persisting or worsening symptoms, or to return to the clinic if unable to obtain PCP follow up for worsening symptoms.    The patient tolerated their visit well. The patient and/or the family were informed of the results of any tests, a time was given to answer questions, a plan was proposed and they agreed with plan. Reviewed AVS with treatment instructions and answered questions - pt/family expresses understanding and agreement with the discussed treatment plan and AVS instructions.      SUBJECTIVE/OBJECTIVE:  HPI:   39 y.o. male presents for complaint of left finger laceration from grabbing a piece of metal    Admits symptoms include bleeding  Denies injury occurring at work    Nothing makes symptoms better.  Pressing on the wound makes symptoms worse.    Has attempted nothing for symptoms     HPI provided by patient and is considered to be a reliable historian.          VITAL SIGNS  Vitals:    25 1725 25 1734   BP: 138/79 128/72   BP Site: Right Upper Arm    Patient Position: Sitting    BP Cuff Size: Large Adult    Pulse: 98    Temp: 98.2 °F (36.8 °C)

## 2025-05-29 VITALS
WEIGHT: 244.6 LBS | HEIGHT: 71 IN | OXYGEN SATURATION: 95 % | TEMPERATURE: 98.2 F | HEART RATE: 98 BPM | BODY MASS INDEX: 34.24 KG/M2 | DIASTOLIC BLOOD PRESSURE: 72 MMHG | SYSTOLIC BLOOD PRESSURE: 128 MMHG

## 2025-06-13 ENCOUNTER — OFFICE VISIT (OUTPATIENT)
Age: 40
End: 2025-06-13

## 2025-06-13 VITALS
SYSTOLIC BLOOD PRESSURE: 124 MMHG | DIASTOLIC BLOOD PRESSURE: 81 MMHG | BODY MASS INDEX: 32.2 KG/M2 | OXYGEN SATURATION: 95 % | WEIGHT: 230 LBS | HEART RATE: 98 BPM | TEMPERATURE: 98.6 F | HEIGHT: 71 IN

## 2025-06-13 DIAGNOSIS — Z48.02 ENCOUNTER FOR REMOVAL OF SUTURES: Primary | ICD-10-CM

## 2025-06-13 NOTE — PROGRESS NOTES
Darci Ramírez (:  1985) is a 39 y.o. male,Established patient, here for evaluation of the following chief complaint(s):  Suture / Staple Removal (Suture removal left index finger)      Assessment & Plan :        Return to office for any other needs       Subjective :    Suture / Staple Removal         39 y.o. male presents with need for suture removal. Sutures were placed on .  Consented for procedure.       Vitals:    25 1656   BP: 124/81   BP Site: Left Upper Arm   Patient Position: Sitting   BP Cuff Size: Large Adult   Pulse: 98   Temp: 98.6 °F (37 °C)   TempSrc: Oral   SpO2: 95%   Weight: 104.3 kg (230 lb)   Height: 1.803 m (5' 11\")       No results found for this visit on 25.      Objective   Physical Exam  Constitutional:       Appearance: Normal appearance.   HENT:      Head: Normocephalic.   Eyes:      Extraocular Movements: Extraocular movements intact.   Pulmonary:      Effort: Pulmonary effort is normal.   Skin:     General: Skin is warm and dry.             Comments: Laceration repair with good wound healing   Neurological:      Mental Status: He is alert and oriented to person, place, and time.           SUTURE REMOVAL    Date/Time: 2025 5:04 PM    Performed by: Nidhi Ignacio APRN - CNP  Authorized by: Nidhi Ignacio APRN - CNP    Consent:     Consent obtained:  Verbal    Consent given by:  Patient    Risks, benefits, and alternatives were discussed: yes      Risks discussed:  Bleeding, pain and wound separation  Location:     Location:  Upper extremity    Upper extremity location:  Hand    Hand location:  L index finger  Procedure details:     Wound appearance:  No signs of infection, good wound healing and clean    Number of sutures removed:  4 (6 were placed but 2 came out on their own)  Post-procedure details:     Post-removal:  No dressing applied    Procedure completion:  Tolerated          An electronic signature was used to authenticate this

## (undated) DEVICE — APPLICATOR MEDICATED 3 CC SOLUTION CLR STRL CHLORAPREP

## (undated) DEVICE — Device: Brand: JELCO

## (undated) DEVICE — 6 ML SYRINGE LUER-LOCK TIP: Brand: MONOJECT

## (undated) DEVICE — PORT VLV 2 W NDL FREE SMRTSITE

## (undated) DEVICE — NEEDLE SPNL WEISS LNG 18 GAX5 IN MOD TUOHY PT TW PERISAFE

## (undated) DEVICE — TRAY ANES EPI 5CC GLS LL 18GA CUST

## (undated) DEVICE — MEDIA CONTRAST RX ISOVUE-300 61% 30ML VIALS

## (undated) DEVICE — STANDARD HYPODERMIC NEEDLE,POLYPROPYLENE HUB: Brand: MONOJECT

## (undated) DEVICE — PEN: MARKING STD 100/CS: Brand: MEDICAL ACTION INDUSTRIES

## (undated) DEVICE — GLOVE ORANGE PI 7 1/2   MSG9075

## (undated) DEVICE — 3 ML SYRINGE LUER-LOCK TIP: Brand: MONOJECT

## (undated) DEVICE — STERILE POLYISOPRENE POWDER-FREE SURGICAL GLOVES: Brand: PROTEXIS

## (undated) DEVICE — 7496-8Z MINI-PLUS KIT: Brand: DEVON